# Patient Record
Sex: MALE | Race: BLACK OR AFRICAN AMERICAN | NOT HISPANIC OR LATINO | URBAN - NONMETROPOLITAN AREA
[De-identification: names, ages, dates, MRNs, and addresses within clinical notes are randomized per-mention and may not be internally consistent; named-entity substitution may affect disease eponyms.]

---

## 2019-05-07 ENCOUNTER — IMPORTED ENCOUNTER (OUTPATIENT)
Dept: URBAN - NONMETROPOLITAN AREA CLINIC 1 | Facility: CLINIC | Age: 59
End: 2019-05-07

## 2019-05-07 PROBLEM — H25.813: Noted: 2019-05-07

## 2019-05-07 PROBLEM — H52.03: Noted: 2019-05-07

## 2019-05-07 PROBLEM — H52.223: Noted: 2019-05-07

## 2019-05-07 PROBLEM — H35.3111: Noted: 2019-05-07

## 2019-05-07 PROBLEM — H52.4: Noted: 2019-05-07

## 2019-05-07 PROCEDURE — 92004 COMPRE OPH EXAM NEW PT 1/>: CPT

## 2019-05-07 PROCEDURE — 92015 DETERMINE REFRACTIVE STATE: CPT

## 2019-05-07 NOTE — PATIENT DISCUSSION
Cataract OU-Not yet surgical. -Reviewed symptoms of advancing cataract growth such as glare and halos and decreased vision.-Continue to monitor for now. Pt will notify us if any new symptoms develop. AMD - dry-Explained dry AMD and advised that there are currently no treatments available. -Recommend pt begin AREDS 2 MVT and monitoring Amsler grid.-Amsler grid given and explained to pt. Recommend monitoring daily. Pt is to contact our office if any changes are noted. Hyperopia-Discussed diagnosis with patient. Astigmatism-Discussed diagnosis with patient. Presbyopia-Discussed diagnosis with patient. Updated spec Rx given. Recommend lens that will provide comfort as well as protect safety and health of eyes. RTC 2 yr CEE

## 2021-08-05 ENCOUNTER — IMPORTED ENCOUNTER (OUTPATIENT)
Dept: URBAN - NONMETROPOLITAN AREA CLINIC 1 | Facility: CLINIC | Age: 61
End: 2021-08-05

## 2021-08-05 PROBLEM — H25.813: Noted: 2019-05-07

## 2021-08-05 PROBLEM — H35.3131: Noted: 2021-08-05

## 2021-08-05 PROBLEM — H52.4: Noted: 2019-05-07

## 2021-08-05 PROBLEM — H52.223: Noted: 2019-05-07

## 2021-08-05 PROBLEM — H52.03: Noted: 2019-05-07

## 2021-08-05 PROCEDURE — 92014 COMPRE OPH EXAM EST PT 1/>: CPT

## 2021-08-05 PROCEDURE — 92134 CPTRZ OPH DX IMG PST SGM RTA: CPT

## 2021-08-05 NOTE — PATIENT DISCUSSION
Cataract OU-Not yet surgical. -Reviewed symptoms of advancing cataract growth such as glare and halos and decreased vision.-Continue to monitor for now. Pt will notify us if any new symptoms develop. AMD - dry-Explained dry AMD and advised that there are currently no treatments available. -Recommend pt begin AREDS 2 MVT and monitoring Amsler grid.-Amsler grid given and explained to pt. Recommend monitoring daily. Pt is to contact our office if any changes are noted. -OCT MAC performed and reviewed w/pt Hyperopia/asigmatism/presbyopia-Discussed diagnosis with patient. Updated spec Rx given. Recommend lens that will provide comfort as well as protect safety and health of eyes. RTC 2 yr CEE

## 2022-04-09 ASSESSMENT — TONOMETRY
OD_IOP_MMHG: 16
OS_IOP_MMHG: 17
OD_IOP_MMHG: 19
OS_IOP_MMHG: 16

## 2022-04-09 ASSESSMENT — VISUAL ACUITY
OS_SC: 20/30+2
OD_SC: 20/30-
OS_CC: 20/20
OS_SC: 20/25
OD_SC: 20/30
OD_CC: 20/20

## 2023-09-15 ENCOUNTER — ESTABLISHED PATIENT (OUTPATIENT)
Dept: RURAL CLINIC 2 | Facility: CLINIC | Age: 63
End: 2023-09-15

## 2023-09-15 DIAGNOSIS — H16.223: ICD-10-CM

## 2023-09-15 DIAGNOSIS — H52.03: ICD-10-CM

## 2023-09-15 DIAGNOSIS — H52.4: ICD-10-CM

## 2023-09-15 DIAGNOSIS — H35.3131: ICD-10-CM

## 2023-09-15 DIAGNOSIS — H25.813: ICD-10-CM

## 2023-09-15 DIAGNOSIS — H52.223: ICD-10-CM

## 2023-09-15 PROCEDURE — 92015 DETERMINE REFRACTIVE STATE: CPT

## 2023-09-15 PROCEDURE — 92134 CPTRZ OPH DX IMG PST SGM RTA: CPT

## 2023-09-15 PROCEDURE — 92014 COMPRE OPH EXAM EST PT 1/>: CPT

## 2023-09-15 ASSESSMENT — TONOMETRY
OD_IOP_MMHG: 19
OS_IOP_MMHG: 19

## 2023-09-15 ASSESSMENT — VISUAL ACUITY
OS_CC: 20/25+4
OD_CC: 20/20

## 2025-05-22 ENCOUNTER — COMPREHENSIVE EXAM (OUTPATIENT)
Age: 65
End: 2025-05-22

## 2025-05-22 DIAGNOSIS — H25.813: ICD-10-CM

## 2025-05-22 DIAGNOSIS — H35.013: ICD-10-CM

## 2025-05-22 DIAGNOSIS — H52.03: ICD-10-CM

## 2025-05-22 DIAGNOSIS — H16.223: ICD-10-CM

## 2025-05-22 DIAGNOSIS — H52.4: ICD-10-CM

## 2025-05-22 DIAGNOSIS — H52.223: ICD-10-CM

## 2025-05-22 PROCEDURE — 99214 OFFICE O/P EST MOD 30 MIN: CPT

## 2025-05-22 PROCEDURE — 92015 DETERMINE REFRACTIVE STATE: CPT

## 2025-05-22 PROCEDURE — 92134 CPTRZ OPH DX IMG PST SGM RTA: CPT
